# Patient Record
Sex: FEMALE | Race: WHITE
[De-identification: names, ages, dates, MRNs, and addresses within clinical notes are randomized per-mention and may not be internally consistent; named-entity substitution may affect disease eponyms.]

---

## 2018-07-28 ENCOUNTER — HOSPITAL ENCOUNTER (EMERGENCY)
Dept: HOSPITAL 62 - ER | Age: 18
LOS: 1 days | Discharge: HOME | End: 2018-07-29
Payer: SELF-PAY

## 2018-07-28 DIAGNOSIS — R10.814: ICD-10-CM

## 2018-07-28 DIAGNOSIS — R10.2: Primary | ICD-10-CM

## 2018-07-28 DIAGNOSIS — R11.2: ICD-10-CM

## 2018-07-28 LAB
APPEARANCE UR: CLEAR
APTT PPP: YELLOW S
BILIRUB UR QL STRIP: NEGATIVE
GLUCOSE UR STRIP-MCNC: NEGATIVE MG/DL
KETONES UR STRIP-MCNC: NEGATIVE MG/DL
NITRITE UR QL STRIP: NEGATIVE
PH UR STRIP: 7 [PH] (ref 5–9)
PROT UR STRIP-MCNC: NEGATIVE MG/DL
SP GR UR STRIP: 1.01
UROBILINOGEN UR-MCNC: NEGATIVE MG/DL (ref ?–2)

## 2018-07-28 PROCEDURE — 81001 URINALYSIS AUTO W/SCOPE: CPT

## 2018-07-28 PROCEDURE — 87591 N.GONORRHOEAE DNA AMP PROB: CPT

## 2018-07-28 PROCEDURE — 85025 COMPLETE CBC W/AUTO DIFF WBC: CPT

## 2018-07-28 PROCEDURE — 99284 EMERGENCY DEPT VISIT MOD MDM: CPT

## 2018-07-28 PROCEDURE — 96374 THER/PROPH/DIAG INJ IV PUSH: CPT

## 2018-07-28 PROCEDURE — 81025 URINE PREGNANCY TEST: CPT

## 2018-07-28 PROCEDURE — 83690 ASSAY OF LIPASE: CPT

## 2018-07-28 PROCEDURE — 87491 CHLMYD TRACH DNA AMP PROBE: CPT

## 2018-07-28 PROCEDURE — 80053 COMPREHEN METABOLIC PANEL: CPT

## 2018-07-28 PROCEDURE — 96375 TX/PRO/DX INJ NEW DRUG ADDON: CPT

## 2018-07-28 PROCEDURE — 36415 COLL VENOUS BLD VENIPUNCTURE: CPT

## 2018-07-28 PROCEDURE — 76830 TRANSVAGINAL US NON-OB: CPT

## 2018-07-29 VITALS — DIASTOLIC BLOOD PRESSURE: 59 MMHG | SYSTOLIC BLOOD PRESSURE: 104 MMHG

## 2018-07-29 LAB
ADD MANUAL DIFF: NO
ALBUMIN SERPL-MCNC: 4.5 G/DL (ref 3.7–5.6)
ALP SERPL-CCNC: 66 U/L (ref 50–135)
ALT SERPL-CCNC: 31 U/L (ref 5–35)
ANION GAP SERPL CALC-SCNC: 16 MMOL/L (ref 5–19)
AST SERPL-CCNC: 26 U/L (ref 5–30)
BASOPHILS # BLD AUTO: 0.1 10^3/UL (ref 0–0.2)
BASOPHILS NFR BLD AUTO: 0.8 % (ref 0–2)
BILIRUB DIRECT SERPL-MCNC: 0.3 MG/DL (ref 0–0.4)
BILIRUB SERPL-MCNC: 0.4 MG/DL (ref 0.2–1.3)
BUN SERPL-MCNC: 12 MG/DL (ref 7–20)
CALCIUM: 10 MG/DL (ref 8.4–10.2)
CHLAM PCR: NOT DETECTED
CHLORIDE SERPL-SCNC: 101 MMOL/L (ref 98–107)
CO2 SERPL-SCNC: 26 MMOL/L (ref 22–30)
EOSINOPHIL # BLD AUTO: 0.2 10^3/UL (ref 0–0.6)
EOSINOPHIL NFR BLD AUTO: 2.9 % (ref 0–6)
ERYTHROCYTE [DISTWIDTH] IN BLOOD BY AUTOMATED COUNT: 13.2 % (ref 11.5–14)
GLUCOSE SERPL-MCNC: 88 MG/DL (ref 75–110)
GON PCR: NOT DETECTED
HCT VFR BLD CALC: 41.2 % (ref 36–47)
HGB BLD-MCNC: 14 G/DL (ref 12–15.5)
LIPASE SERPL-CCNC: 109 U/L (ref 23–300)
LYMPHOCYTES # BLD AUTO: 2.7 10^3/UL (ref 0.5–4.7)
LYMPHOCYTES NFR BLD AUTO: 33.3 % (ref 13–45)
MCH RBC QN AUTO: 31.5 PG (ref 27–33.4)
MCHC RBC AUTO-ENTMCNC: 34.1 G/DL (ref 32–36)
MCV RBC AUTO: 93 FL (ref 80–97)
MONOCYTES # BLD AUTO: 0.6 10^3/UL (ref 0.1–1.4)
MONOCYTES NFR BLD AUTO: 7.3 % (ref 3–13)
NEUTROPHILS # BLD AUTO: 4.4 10^3/UL (ref 1.7–8.2)
NEUTS SEG NFR BLD AUTO: 55.7 % (ref 42–78)
PLATELET # BLD: 223 10^3/UL (ref 150–450)
POTASSIUM SERPL-SCNC: 3.7 MMOL/L (ref 3.6–5)
PROT SERPL-MCNC: 7.7 G/DL (ref 6.3–8.2)
RBC # BLD AUTO: 4.45 10^6/UL (ref 3.72–5.28)
SODIUM SERPL-SCNC: 143.3 MMOL/L (ref 137–145)
TOTAL CELLS COUNTED % (AUTO): 100 %
WBC # BLD AUTO: 8 10^3/UL (ref 4–10.5)

## 2018-07-29 NOTE — RADIOLOGY REPORT (SQ)
EXAM DESCRIPTION: 



US TRANSVAGINAL



COMPLETED DATE/TME:  07/29/2018 01:28



CLINICAL HISTORY: 



17 years, Female, Right lower quadrant/pelvic pain 7/15/2018



COMPARISON:

None.



TECHNIQUE:

Complete pelvic ultrasound with transvaginal imaging. 





FINDINGS:



The uterus measures 5.7 x 4.0 x 4.0 cm. Endometrial thickness of

1.0 cm. Cervical length of 3.1 cm. The cervix is closed. No

myometrial abnormalities.



Tiny amount of free pelvic fluid.



No abnormalities in the adnexa. The ovaries are not identified.



IMPRESSION:





1. Small amount of free pelvic fluid. This may be physiologic.



2. The ovaries are not identified.

 



 2011 [employer]- All Rights Reserved

## 2018-07-29 NOTE — ER DOCUMENT REPORT
ED Medical Screen (RME)





- General


Chief Complaint: Abdominal Pain


Stated Complaint: VOMITING


Time Seen by Provider: 07/29/18 00:07


Mode of Arrival: Wheelchair


Information source: Patient


Notes: 





Patient is an otherwise healthy 18-year-old female who presents with chief 

complaint of sudden onset dry heaving with abdominal pain.  Patient reports 

that she was at the beach today with no symptoms at all.  At 545 she started 

dry heaving with no results.  Patient also had sudden onset diffuse abdominal 

pain.  Patient now has point tenderness to the right lower quadrant.  Patient 

denies any abnormal vaginal discharge or any dysuria.  Patient also denies any 

fever, vomiting or diarrhea.





I have greeted and performed a rapid initial assessment of this patient.  A 

comprehensive ED assessment and evaluation of the patient, analysis of test 

results and completion of the medical decision making process will be conducted 

by additional ED providers.





Dictation of this chart was performed using voice recognition software; 

therefore, there may be some unintended grammatical errors.











TRAVEL OUTSIDE OF THE [place] IN LAST 30 DAYS: No





Physical Exam





- Vital signs


Vitals: 





 











Temp Pulse Resp BP Pulse Ox


 


 97.9 F   94 H  20   108/83   100 


 


 07/28/18 21:53  07/28/18 21:53  07/28/18 21:53  07/28/18 21:53  07/28/18 21:53














Course





- Vital Signs


Vital signs: 





 











Temp Pulse Resp BP Pulse Ox


 


 97.9 F   94 H  20   108/83   100 


 


 07/28/18 21:53  07/28/18 21:53  07/28/18 21:53  07/28/18 21:53  07/28/18 21:53

## 2018-07-29 NOTE — ER DOCUMENT REPORT
ED GI/





- General


Mode of Arrival: Wheelchair


Information source: Patient


TRAVEL OUTSIDE OF THE [place] IN LAST 30 DAYS: No





<PANCHITO YE - Last Filed: 07/29/18 02:13>





<NOEMI POTTER - Last Filed: 07/29/18 04:32>





- General


Chief Complaint: Abdominal Pain


Stated Complaint: VOMITING


Time Seen by Provider: 07/29/18 00:07


Notes: 





17-year-old female who presents to the emergency department today with 

complaints of nausea and abdominal pain that began at 1745.  Patient mentions 

that she swallowed large amounts of saltwater today while at the beach.  

Patient states she was driving home when all her symptoms began.  Patient 

states her last menstrual period was on 7/15.  Patient denies any diarrhea. (

PANCHITO YE)





- Related Data


Allergies/Adverse Reactions: 


 





No Known Allergies Allergy (Unverified 07/29/18 00:56)


 











Past Medical History





- General


Information source: Patient





- Social History


Smoking Status: Never Smoker


Cigarette use (# per day): No


Frequency of alcohol use: None


Drug Abuse: None


Lives with: Family


Family History: Reviewed & Not Pertinent





<PANCHITO YE - Last Filed: 07/29/18 02:13>





Review of Systems





- Review of Systems


Constitutional: No symptoms reported


EENT: No symptoms reported


Cardiovascular: No symptoms reported


Respiratory: No symptoms reported


Gastrointestinal: See HPI, Abdominal pain, Nausea.  denies: Diarrhea


Genitourinary: No symptoms reported


Female Genitourinary: No symptoms reported


Musculoskeletal: No symptoms reported


Skin: No symptoms reported


Hematologic/Lymphatic: No symptoms reported


Neurological/Psychological: No symptoms reported


-: Yes All other systems reviewed and negative





<PANCHITO YE - Last Filed: 07/29/18 02:13>





Physical Exam





<PANCHITO YE - Last Filed: 07/29/18 02:13>





<NOEMI POTTER - Last Filed: 07/29/18 04:32>





- Vital signs


Vitals: 


 











Temp Pulse Resp BP Pulse Ox


 


 97.9 F   94 H  20   108/83   100 


 


 07/28/18 21:53  07/28/18 21:53  07/28/18 21:53  07/28/18 21:53  07/28/18 21:53














- Notes


Notes: 





Physical Exam:


 


General: Alert, appears well. 


 


HEENT: Normocephalic. Atraumatic. PERRL. Extraocular movements intact. 

Oropharynx clear.


 


Neck: Supple. Non-tender.


 


Respiratory: No respiratory distress. Clear and equal breath sounds bilaterally.


 


Cardiovascular: Regular rate and rhythm. 


 


Abdominal: RLQ and pelvic tenderness with palpation. No distension. Normal 

Bowel Sounds. 


 


Back: Non-tender. No deformity or step off.


 


Extremities: Moves all four extremities.


Upper extremities: Normal inspection. Normal ROM.  


Lower extremities: Normal inspection. No edema. Normal ROM.


 


Neurological: Normal cognition. AAOx4. Normal speech.  


 


Psychological: Normal affect. Normal Mood. 


 


Skin: Warm. Dry. Normal color. (PANCHITO YE)





Course





- Laboratory


Result Diagrams: 


 07/29/18 00:35





 07/29/18 00:35





<PANCHITO YE - Last Filed: 07/29/18 02:13>





- Laboratory


Result Diagrams: 


 07/29/18 00:35





 07/29/18 00:35





- Diagnostic Test


Radiology reviewed: Reports reviewed - Ultrasound is read as unremarkable by 

the radiologist.





<NOEMI POTTER - Last Filed: 07/29/18 04:32>





- Re-evaluation


Re-evalutation: 





07/29/18 03:28


I just went to check on the patient, she was sound asleep laying in bed 

intertwined with her boyfriend.


I woke her up to let her know we are waiting for the ultrasound reading, and 

the nurse was going to come in to have her provide a dirty collection urine.  

She states that she is feeling much better at this time. (NOEMI POTTER)





- Vital Signs


Vital signs: 


 











Temp Pulse Resp BP Pulse Ox


 


 97.9 F   94 H  20   108/83   100 


 


 07/28/18 21:53  07/28/18 21:53  07/28/18 21:53  07/28/18 21:53  07/28/18 21:53














Discharge





<PANCHITO YE - Last Filed: 07/29/18 02:13>





<NOEMI POTTER - Last Filed: 07/29/18 04:32>





- Discharge


Clinical Impression: 


 Pelvic pain





Condition: Stable


Disposition: HOME, SELF-CARE


Additional Instructions: 


Pelvic Pain:





     There are many causes of pain in the pelvic area.  The cause could be the 

tubes, ovaries, uterus, intestines, appendix, pelvic muscles and connective 

tissue, or the urinary tract.  The cause of your pelvic pain is not clear.  

However, it seems safe to treat you outside the hospital.


     If the pain sounds like a temporary problem, we sometimes wait to see if 

it goes away.  Other patients may need additional tests, such as pelvic 

ultrasound or cultures.


     Conditions may change.  Call us or come back for reexamination if any 

problems occur, such as: 


(1) Pain that becomes more severe, steady, or becomes concentrated in one 

specific area.  Also, pain that is more severe with movement or coughing.  


(2) Vomiting that persists or becomes more frequent.  


(3) Blood in the vomitus, urine, or bowel movements. Blood in the stool may 

have a tarry or black appearance. 


(4) Shaking chills or fever greater than 100 degrees.  


(5) The abdomen becomes more distended or swollen.  


(6) Bowel movements cease.  


(7) Heavy vaginal bleeding.








********************************************************************************

****************





Take the medication as prescribed.


Take Tylenol and ibuprofen or Aleve for pain.


Drink plenty of fluids.


Follow-up with [facility] if not improving.





RETURN TO THE EMERGENCY ROOM IF ANY NEW OR WORSENING SYMPTOMS.








Prescriptions: 


Doxycycline Hyclate 100 mg PO BID #14 tablet


Referrals: 


[facility] [Provider Group] - Follow up as needed


Scribe Attestation: 





07/29/18 01:13


I personally performed the services described in the documentation, reviewed 

and edited the documentation which was dictated to the scribe in my presence, 

and it accurately records my words and actions. (NOEMI POTTER)





Scribe Documentation





- Scribe


Written by Scribe:: Sajan Olivier, 7/29/2018 0218


acting as scribe for :: Kelly





<PANCHITO YE - Last Filed: 07/29/18 02:13>

## 2018-09-04 ENCOUNTER — HOSPITAL ENCOUNTER (EMERGENCY)
Dept: HOSPITAL 62 - ER | Age: 18
Discharge: HOME | End: 2018-09-04
Payer: SELF-PAY

## 2018-09-04 VITALS — SYSTOLIC BLOOD PRESSURE: 124 MMHG | DIASTOLIC BLOOD PRESSURE: 70 MMHG

## 2018-09-04 DIAGNOSIS — T43.225A: ICD-10-CM

## 2018-09-04 DIAGNOSIS — R11.2: Primary | ICD-10-CM

## 2018-09-04 PROCEDURE — S0119 ONDANSETRON 4 MG: HCPCS

## 2018-09-04 PROCEDURE — 99283 EMERGENCY DEPT VISIT LOW MDM: CPT

## 2018-09-04 NOTE — ER DOCUMENT REPORT
ED General





- General


Chief Complaint: Allergic Reaction


Stated Complaint: SHORTNESS OF BREATH


Time Seen by Provider: 09/04/18 01:01


Notes: 





Patient is a 17-year-old female that comes to the emergency department for 

chief complaint of possible medication reaction.  She states that she took her 

Zoloft about 1 hour ago, she states she started feeling a burning sensation 

mainly in her upper abdomen that wrapped around towards the right side of her 

abdomen, she states that she started feeling flushed, she then vomited.  She 

states that she has a irritated feeling in the back of her throat.  She states 

she is worried she is having an allergic reaction to the Zoloft.  She denies 

difficulty breathing, rash, swelling, or any other symptoms.  She denies 

current nausea or abdominal pain.  This is her fourth dose of the Zoloft.  She 

denies any other daily medications, denies any surgeries except orthopedic.


TRAVEL OUTSIDE OF THE [place] IN LAST 30 DAYS: No





- Related Data


Allergies/Adverse Reactions: 


 





No Known Allergies Allergy (Unverified 07/29/18 00:56)


 











Past Medical History





- General


Information source: Patient





- Social History


Smoking Status: Never Smoker


Frequency of alcohol use: None


Drug Abuse: None


Lives with: Family


Family History: Reviewed & Not Pertinent





- Medical History


Medical History: Negative


Renal/ Medical History: Denies: Hx Peritoneal Dialysis


Psychiatric Medical History: Reports: Hx Anxiety, Hx Depression


Past Surgical History: Reports: Hx Orthopedic Surgery





- Immunizations


Immunizations up to date: Yes


Hx Diphtheria, Pertussis, Tetanus Vaccination: Yes





Review of Systems





- Review of Systems


Constitutional: See HPI


EENT: No symptoms reported


Cardiovascular: No symptoms reported


Respiratory: No symptoms reported


Gastrointestinal: No symptoms reported


Genitourinary: See HPI


Female Genitourinary: No symptoms reported


Musculoskeletal: No symptoms reported


Skin: No symptoms reported


Hematologic/Lymphatic: No symptoms reported


Neurological/Psychological: No symptoms reported





Physical Exam





- Vital signs


Vitals: 


 











Temp Pulse Resp BP Pulse Ox


 


 97.8 F   77   18   124/70   100 


 


 09/04/18 00:54  09/04/18 00:54  09/04/18 00:54  09/04/18 00:54  09/04/18 00:54














- Notes


Notes: 





GENERAL: Very petite, alert, appears anxious but does not appear to be in 

distress


HEAD: Normocephalic, atraumatic.


EYES: Pupils equal, round, and reactive to light. Extraocular movements intact.


ENT: Oral mucosa moist, tongue midline.  Unremarkable oropharyngeal exam with 

no tongue swelling or swelling of the posterior pharynx.  Clear airway.  Nasal 

exam normal.  Remaining ENT exam normal


NECK: Full range of motion. Supple. Trachea midline.


LUNGS: Clear to auscultation bilaterally, no wheezes, rales, or rhonchi. No 

respiratory distress.


HEART: Regular rate and rhythm. No murmur


ABDOMEN: Soft, non-tender. Non-distended. Bowel sounds present in all 4 

quadrants.


EXTREMITIES: Moves all 4 extremities spontaneously. No edema, normal radial and 

dorsalis pedis pulses bilaterally. No cyanosis.


BACK: no cervical, thoracic, lumbar midline tenderness. No saddle anesthesia, 

normal distal neurovascular exam. 


NEUROLOGICAL: Alert and oriented x3. Normal speech. [cranial nerves II through 

XII grossly intact]. 


PSYCH: Patient speaks anxiously and is glancing around anxiously.


SKIN: Warm, dry, normal turgor. No rashes or lesions noted.








Course





- Re-evaluation


Re-evalutation: 


Patient is very nervous in appearance, however her abdomen is soft, lungs clear

, oropharynx unremarkable, no rash.  Does not appear to be anaphylaxis, could 

be medication side effect.  Given Zofran, Maalox, will reevaluate.








Patient asymptomatic on reevaluation.  Patient has a follow-up with her 

provider who gave her the Zoloft tomorrow.  Advised her to stop taking the 

Zoloft because of her symptoms afterwards which appear to be possible side 

effects.  Provided with Zofran to go home with in case she develops nausea 

again.  Discussed return precautions including signs of anaphylaxis.  Patient 

states understanding and agreement.





- Vital Signs


Vital signs: 


 











Temp Pulse Resp BP Pulse Ox


 


 97.8 F   77   12   124/70   99 


 


 09/04/18 00:54  09/04/18 00:54  09/04/18 02:00  09/04/18 00:54  09/04/18 02:00














Discharge





- Discharge


Clinical Impression: 


 Medication side effects





Vomiting


Qualifiers:


 Vomiting type: unspecified Vomiting Intractability: non-intractable Nausea 

presence: with nausea Qualified Code(s): R11.2 - Nausea with vomiting, 

unspecified





Condition: Stable


Disposition: HOME, SELF-CARE


Additional Instructions: 


After the side effects that you had tonight, possibly from the medication, I 

recommend you stop the medication.  Follow-up with your provider for additional 

evaluation and management.  Take Zofran if needed for nausea while the 

medication is still your system.  Return if you worsen including vomiting, 

swelling of the tongue, throat, face, rash, difficulty breathing, or any other 

concerning symptoms.